# Patient Record
Sex: FEMALE | Race: WHITE | URBAN - METROPOLITAN AREA
[De-identification: names, ages, dates, MRNs, and addresses within clinical notes are randomized per-mention and may not be internally consistent; named-entity substitution may affect disease eponyms.]

---

## 2021-03-15 ENCOUNTER — LAB OUTSIDE AN ENCOUNTER (OUTPATIENT)
Dept: URBAN - METROPOLITAN AREA CLINIC 35 | Facility: CLINIC | Age: 81
End: 2021-03-15

## 2021-03-16 ENCOUNTER — LAB OUTSIDE AN ENCOUNTER (OUTPATIENT)
Dept: URBAN - METROPOLITAN AREA CLINIC 37 | Facility: CLINIC | Age: 81
End: 2021-03-16

## 2021-03-16 ENCOUNTER — OFFICE VISIT (OUTPATIENT)
Dept: URBAN - METROPOLITAN AREA CLINIC 35 | Facility: CLINIC | Age: 81
End: 2021-03-16

## 2021-03-16 VITALS — OXYGEN SATURATION: 98 % | WEIGHT: 147 LBS | HEIGHT: 67 IN | HEART RATE: 78 BPM | BODY MASS INDEX: 23.07 KG/M2

## 2021-03-16 RX ORDER — PAROXETINE HYDROCHLORIDE HEMIHYDRATE 20 MG/1
1 TABLET IN THE MORNING TABLET, FILM COATED ORAL ONCE A DAY
Qty: 30 | Status: ACTIVE | COMMUNITY

## 2021-03-16 RX ORDER — QUINAPRIL HYDROCHLORIDE AND HYDROCHLOROTHIAZIDE 20; 12.5 MG/1; MG/1
1 TABLET TABLET, FILM COATED ORAL ONCE A DAY
Qty: 30 | Status: ACTIVE | COMMUNITY

## 2021-03-16 RX ORDER — LEVOTHYROXINE SODIUM 88 UG/1
1 TABLET IN THE MORNING ON AN EMPTY STOMACH TABLET ORAL ONCE A DAY
Qty: 30 | Status: ACTIVE | COMMUNITY

## 2021-03-16 RX ORDER — TRAMADOL HYDROCHLORIDE 50 MG/1
1 TABLET AS NEEDED TABLET, FILM COATED ORAL ONCE A DAY
Status: DISCONTINUED | COMMUNITY

## 2021-03-16 RX ORDER — OMEGA-3/DHA/EPA/FISH OIL 60 MG-90MG
2 CAPSULE CAPSULE ORAL
Status: ACTIVE | COMMUNITY

## 2021-03-16 NOTE — HPI-MIGRATED HPI
Interim investigations : Labs done on: ->  * 02/16/2021 at PCP:  - CMP: Glu: 93 ; BUN: 16 ; Cr: 1.0 ; Na: 142 ; K: 5.0 ; Alb: 4.5 ; Tbili: 0.6 ; ALP: 65 ; ALT: 121 (H) ; AST: 61 (H)  - CBC: WBC: 7.3 ; RBC: 4.52 ; Hgb: 13.3 ; Hct: 40.6 ; Plt: 218;   Initial consultation : Patient is here for -> Elevated Liver Enzyme Detected on recent lab done at PCP on 02/16/2021  Patient denies Alcohol use  Patient admits use of statins for > 20 years but stopped 2-3 years ago, stopped medications but due to severe headache while on meds Patient denies personal exposure to liver disease such as viral hepatitis Patient denies family history of liver disease or cancer  Patient denies protein supplements, tattoos, piercings,  service, blood transfusion Denies any prior US abdomen   Last colon done 2017 with a GI docotor in [place]: 1-2 benign polyps were detected and resected;

## 2021-03-21 LAB
ABSOLUTE BASOPHILS: 53
ABSOLUTE EOSINOPHILS: 319
ABSOLUTE LYMPHOCYTES: 2044
ABSOLUTE MONOCYTES: 509
ABSOLUTE NEUTROPHILS: 4674
ALBUMIN/GLOBULIN RATIO: 1.7
ALBUMIN: 4.3
ALKALINE PHOSPHATASE: 77
ALT: 15
ANA SCREEN, IFA: NEGATIVE
AST: 24
BASOPHILS: 0.7
BILIRUBIN, TOTAL: 0.5
BUN/CREATININE RATIO: 23
CALCIUM: 9.9
CARBON DIOXIDE: 28
CHLORIDE: 98
CREATININE: 1.01
EGFR AFRICAN AMERICAN: 61
EGFR NON-AFR. AMERICAN: 53
EOSINOPHILS: 4.2
GLOBULIN: 2.5
GLUCOSE: 86
HEMATOCRIT: 38.5
HEMOGLOBIN: 12.6
HEPATITIS A AB, TOTAL: (no result)
HEPATITIS B CORE AB TOTAL: (no result)
HEPATITIS B SURFACE: (no result)
HEPATITIS B SURFACE: (no result)
HEPATITIS C ANTIBODY: (no result)
LKM-1 ANTIBODY (IGG): <=20
LYMPHOCYTES: 26.9
MCH: 29.3
MCHC: 32.7
MCV: 89.5
MITOCHONDRIAL AB SCREEN: NEGATIVE
MONOCYTES: 6.7
MPV: 12.4
NEUTROPHILS: 61.5
PLATELET COUNT: 209
POTASSIUM: 4.3
PROTEIN, TOTAL: 6.8
RDW: 13.3
RED BLOOD CELL COUNT: 4.3
SIGNAL TO CUT-OFF: 0.02
SMOOTH MUSCLE AB SCREEN: NEGATIVE
SODIUM: 136
SOLUBLE LIVER ANTIGEN (SLA) AUTOANTIBODY: <20.1
UREA NITROGEN (BUN): 23
WHITE BLOOD CELL COUNT: 7.6

## 2021-03-30 ENCOUNTER — OFFICE VISIT (OUTPATIENT)
Dept: URBAN - METROPOLITAN AREA CLINIC 35 | Facility: CLINIC | Age: 81
End: 2021-03-30

## 2021-03-30 VITALS — WEIGHT: 147 LBS | HEIGHT: 67 IN | BODY MASS INDEX: 23.07 KG/M2

## 2021-03-30 PROBLEM — 82934008: Status: ACTIVE | Noted: 2021-03-16

## 2021-03-30 PROBLEM — 365767001: Status: ACTIVE | Noted: 2021-03-15

## 2021-03-30 PROBLEM — 305058001: Status: ACTIVE | Noted: 2021-03-15

## 2021-03-30 RX ORDER — OMEGA-3/DHA/EPA/FISH OIL 60 MG-90MG
2 CAPSULE CAPSULE ORAL
Status: ACTIVE | COMMUNITY

## 2021-03-30 RX ORDER — PAROXETINE HYDROCHLORIDE HEMIHYDRATE 20 MG/1
1 TABLET IN THE MORNING TABLET, FILM COATED ORAL ONCE A DAY
Qty: 30 | Status: ACTIVE | COMMUNITY

## 2021-03-30 RX ORDER — QUINAPRIL HYDROCHLORIDE AND HYDROCHLOROTHIAZIDE 20; 12.5 MG/1; MG/1
1 TABLET TABLET, FILM COATED ORAL ONCE A DAY
Qty: 30 | Status: ACTIVE | COMMUNITY

## 2021-03-30 RX ORDER — LEVOTHYROXINE SODIUM 88 UG/1
1 TABLET IN THE MORNING ON AN EMPTY STOMACH TABLET ORAL ONCE A DAY
Qty: 30 | Status: ACTIVE | COMMUNITY

## 2021-03-30 NOTE — HPI-MIGRATED HPI
Follow up OV : Patient is here for a routine OV for -> Transaminitis and Constipation ;   Follow up OV : Patient is on -> ;   Follow up OV : Last OV was -> 2 weeks ago Transaminitis: Detected on recent lab done at PCP on 02/16/2021  Patient denies Alcohol use  Patient admits use of statins for > 20 years but stopped 2-3 years ago, stopped medications but due to severe headache while on meds Patient denies personal exposure to liver disease such as viral hepatitis Patient denies family history of liver disease or cancer  Patient is here to discuss recent labs as ordered at last OV, see below  Chronic idiopathic constipation: Patient is currently taking Metamucil daily + MiraLax PRN Current BM: 1 BM daily occasionally has to increase MiraLax and add OTC stool softners ;   Interim investigations : Labs done on: ->  * 03/16/2021, see below * 02/16/2021 at PCP:  - CMP: Glu: 93 ; BUN: 16 ; Cr: 1.0 ; Na: 142 ; K: 5.0 ; Alb: 4.5 ; Tbili: 0.6 ; ALP: 65 ; ALT: 121 (H) ; AST: 61 (H)  - CBC: WBC: 7.3 ; RBC: 4.52 ; Hgb: 13.3 ; Hct: 40.6 ; Plt: 218;

## 2022-03-29 ENCOUNTER — OFFICE VISIT (OUTPATIENT)
Dept: URBAN - METROPOLITAN AREA CLINIC 35 | Facility: CLINIC | Age: 82
End: 2022-03-29